# Patient Record
Sex: MALE
[De-identification: names, ages, dates, MRNs, and addresses within clinical notes are randomized per-mention and may not be internally consistent; named-entity substitution may affect disease eponyms.]

---

## 2022-07-05 ENCOUNTER — HOSPITAL ENCOUNTER (EMERGENCY)
Dept: HOSPITAL 5 - ED | Age: 37
Discharge: LEFT BEFORE DISCHARGE | End: 2022-07-05
Payer: SELF-PAY

## 2022-07-05 VITALS — SYSTOLIC BLOOD PRESSURE: 106 MMHG | DIASTOLIC BLOOD PRESSURE: 65 MMHG

## 2022-07-05 DIAGNOSIS — Z53.21: ICD-10-CM

## 2022-07-05 DIAGNOSIS — Z00.00: Primary | ICD-10-CM

## 2022-07-05 PROCEDURE — 99283 EMERGENCY DEPT VISIT LOW MDM: CPT

## 2022-08-27 ENCOUNTER — HOSPITAL ENCOUNTER (EMERGENCY)
Dept: HOSPITAL 5 - ED | Age: 37
Discharge: HOME | End: 2022-08-27
Payer: SELF-PAY

## 2022-08-27 VITALS — SYSTOLIC BLOOD PRESSURE: 135 MMHG | DIASTOLIC BLOOD PRESSURE: 80 MMHG

## 2022-08-27 DIAGNOSIS — L02.416: Primary | ICD-10-CM

## 2022-08-27 DIAGNOSIS — F17.200: ICD-10-CM

## 2022-08-27 PROCEDURE — 99282 EMERGENCY DEPT VISIT SF MDM: CPT

## 2022-08-27 NOTE — EMERGENCY DEPARTMENT REPORT
ED General Adult HPI





- General


Chief complaint: Skin/Abscess/Foreign Body


Stated complaint: SPIDER BITE ON FOOT


Time Seen by Provider: 08/27/22 16:02


Source: patient


Mode of arrival: Ambulatory


Limitations: No Limitations





- History of Present Illness


Initial comments: 





36-year-old male with no significant past medical history reports to the ER with

abscess to the left posterior ankle.  Patient believes it was a spider bite but 

reports he is not completely unsure.  Patient reports he has been picking at it 

and trying to drain abscess.


No other acute symptoms reported.  Patient denies fever, dizziness, weakness.





- Related Data


                                  Previous Rx's











 Medication  Instructions  Recorded  Last Taken  Type


 


Sulfamethoxazole/Trimethoprim 1 each PO BID 10 Days #20 tab 08/27/22 Unknown Rx





[Bactrim DS TAB]    











                                    Allergies











Allergy/AdvReac Type Severity Reaction Status Date / Time


 


Sulfa (Sulfonamide Allergy  Unknown Verified 07/05/22 01:35





Antibiotics)     














ED Review of Systems


ROS: 


Stated complaint: SPIDER BITE ON FOOT


Other details as noted in HPI





Comment: All other systems reviewed and negative


Skin: other (Left ankle with abscess to posterior side.)





ED Past Medical Hx





- Past Medical History


Previous Medical History?: No





- Surgical History


Past Surgical History?: No





- Social History


Smoking Status: Current Every Day Smoker





- Medications


Home Medications: 


                                Home Medications











 Medication  Instructions  Recorded  Confirmed  Last Taken  Type


 


Sulfamethoxazole/Trimethoprim 1 each PO BID 10 Days #20 tab 08/27/22  Unknown Rx





[Bactrim DS TAB]     














ED Physical Exam





- General


Limitations: No Limitations


General appearance: alert, in no apparent distress





- Head


Head exam: Present: atraumatic, normocephalic





- Eye


Eye exam: Present: normal appearance





- ENT


ENT exam: Present: mucous membranes moist





- Neck


Neck exam: Present: normal inspection





- Respiratory


Respiratory exam: Present: normal lung sounds bilaterally.  Absent: respiratory 

distress





- Cardiovascular


Cardiovascular Exam: Present: regular rate, normal rhythm.  Absent: systolic 

murmur, diastolic murmur, rubs, gallop





- GI/Abdominal


GI/Abdominal exam: Present: soft, normal bowel sounds





- Rectal


Rectal exam: Present: deferred





- Extremities Exam


Extremities exam: Present: normal inspection





- Back Exam


Back exam: Present: normal inspection





- Neurological Exam


Neurological exam: Present: alert, oriented X3





- Psychiatric


Psychiatric exam: Present: normal affect, normal mood





- Skin


Skin exam: Present: warm, dry, intact, normal color, other (Left ankle with 

abscess present.  Drainage is noted.  Erythema and swelling is noted.  Abscess 

was expressed with more drainage noted.  No I&D is needed at this time.).  

Absent: rash





ED Course


                                   Vital Signs











  08/27/22





  16:01


 


Temperature 98.3 F


 


Pulse Rate 98 H


 


Respiratory 18





Rate 


 


Blood Pressure 135/80





[Left] 


 


O2 Sat by Pulse 100





Oximetry 














ED Medical Decision Making





- Medical Decision Making





36-year-old male with no significant past medical history reports to the ER with

 left ankle posterior abscess present.  On physical exam


There is erythema and drainage noted from abscess.  No streaking is noted.  No 

advancement of erythema is present or reported by patient.


Since abscess is already draining.  Patient reports drainage.


Abscess does not need I&D.


Patient will be started on oral antibiotics Bactrim DS twice daily for 10 days.


Patient agrees with plan of care and verbalized understanding.  No further work-

up is needed at this time.


Patient informed that if his abscess is to get worse, onset of fever, onset of 

weakness spreading of rash to report back to the ER for further evaluation.








                                   Vital Signs











  08/27/22





  16:01


 


Temperature 98.3 F


 


Pulse Rate 98 H


 


Respiratory 18





Rate 


 


Blood Pressure 135/80





[Left] 


 


O2 Sat by Pulse 100





Oximetry 











Critical care attestation.: 


If time is entered above; I have spent that time in minutes in the direct care 

of this critically ill patient, excluding procedure time.








ED Disposition


Clinical Impression: 


 Abscess of ankle





Disposition: 01 HOME / SELF CARE / HOMELESS


Is pt being admited?: No


Condition: Stable


Instructions:  Skin Abscess


Prescriptions: 


Sulfamethoxazole/Trimethoprim [Bactrim DS TAB] 1 each PO BID 10 Days #20 tab


Referrals: 


ELIZABETH MARIE MD [Primary Care Provider] - 3-5 Days